# Patient Record
Sex: MALE | Race: WHITE | NOT HISPANIC OR LATINO | URBAN - METROPOLITAN AREA
[De-identification: names, ages, dates, MRNs, and addresses within clinical notes are randomized per-mention and may not be internally consistent; named-entity substitution may affect disease eponyms.]

---

## 2017-01-18 ENCOUNTER — IMPORTED ENCOUNTER (OUTPATIENT)
Dept: URBAN - METROPOLITAN AREA CLINIC 50 | Facility: CLINIC | Age: 72
End: 2017-01-18

## 2017-02-21 ENCOUNTER — IMPORTED ENCOUNTER (OUTPATIENT)
Dept: URBAN - METROPOLITAN AREA CLINIC 50 | Facility: CLINIC | Age: 72
End: 2017-02-21

## 2017-03-16 ENCOUNTER — IMPORTED ENCOUNTER (OUTPATIENT)
Dept: URBAN - METROPOLITAN AREA CLINIC 50 | Facility: CLINIC | Age: 72
End: 2017-03-16

## 2017-03-22 ENCOUNTER — IMPORTED ENCOUNTER (OUTPATIENT)
Dept: URBAN - METROPOLITAN AREA CLINIC 50 | Facility: CLINIC | Age: 72
End: 2017-03-22

## 2017-03-31 ENCOUNTER — IMPORTED ENCOUNTER (OUTPATIENT)
Dept: URBAN - METROPOLITAN AREA CLINIC 50 | Facility: CLINIC | Age: 72
End: 2017-03-31

## 2017-04-05 ENCOUNTER — IMPORTED ENCOUNTER (OUTPATIENT)
Dept: URBAN - METROPOLITAN AREA CLINIC 50 | Facility: CLINIC | Age: 72
End: 2017-04-05

## 2017-04-26 ENCOUNTER — IMPORTED ENCOUNTER (OUTPATIENT)
Dept: URBAN - METROPOLITAN AREA CLINIC 50 | Facility: CLINIC | Age: 72
End: 2017-04-26

## 2018-09-25 ENCOUNTER — IMPORTED ENCOUNTER (OUTPATIENT)
Dept: URBAN - METROPOLITAN AREA CLINIC 50 | Facility: CLINIC | Age: 73
End: 2018-09-25

## 2018-09-25 NOTE — PATIENT DISCUSSION
"""Annual Type 2 Diabetic eye exam with dilation. Mild nonproliferative diabetic retinopathy found. Bilateral macular edema is not present. Recommend annual dilated examinations. Patient instructed to call office immediately if sudden changes in vision occur. Emphasized importance of good blood glucose control. Summary of care provided to the physician managing the ongoing diabetes care. """

## 2019-03-26 ENCOUNTER — IMPORTED ENCOUNTER (OUTPATIENT)
Dept: URBAN - METROPOLITAN AREA CLINIC 50 | Facility: CLINIC | Age: 74
End: 2019-03-26

## 2019-09-26 ENCOUNTER — IMPORTED ENCOUNTER (OUTPATIENT)
Dept: URBAN - METROPOLITAN AREA CLINIC 50 | Facility: CLINIC | Age: 74
End: 2019-09-26

## 2021-05-15 ASSESSMENT — VISUAL ACUITY
OS_OTHER: 20/20. 20/40.
OS_SC: 20/30-
OD_BAT: 20/20
OD_PH: 20/60
OS_BAT: 20/30
OS_CC: J5
OD_SC: 20/80+
OD_OTHER: 20/30. 20/40.
OD_SC: 20/60-
OD_SC: 20/50
OS_SC: 20/40
OD_BAT: 20/30
OD_BAT: 20/40
OD_BAT: 20/30
OS_BAT: 20/20
OS_CC: J5
OD_OTHER: 20/20. 20/40.
OS_OTHER: 20/30. 20/40.
OD_PH: 20/30-
OD_OTHER: 20/30. 20/40.
OD_SC: 20/60+
OD_CC: J5
OD_SC: 20/80
OS_SC: 20/50
OS_CC: 20/50-
OS_SC: 20/25
OS_CC: J3
OD_SC: 20/80
OS_OTHER: 20/50. 20/100.
OD_CC: J3
OS_BAT: 20/50
OS_SC: 20/50-
OS_SC: 20/30-1
OS_SC: 20/30
OS_BAT: 20/50
OD_CC: J5
OD_SC: 20/80
OS_PH: 20/40
OD_OTHER: 20/40. 20/60.
OS_OTHER: 20/50. 20/50.

## 2021-05-15 ASSESSMENT — TONOMETRY
OS_IOP_MMHG: 22
OS_IOP_MMHG: 18
OD_IOP_MMHG: 19
OS_IOP_MMHG: 17
OS_IOP_MMHG: 15
OD_IOP_MMHG: 16
OS_IOP_MMHG: 15
OS_IOP_MMHG: 17
OD_IOP_MMHG: 16
OD_IOP_MMHG: 18
OS_IOP_MMHG: 18
OD_IOP_MMHG: 15
OD_IOP_MMHG: 18
OS_IOP_MMHG: 18
OD_IOP_MMHG: 14

## 2021-05-27 ENCOUNTER — COMPREHENSIVE EXAM (OUTPATIENT)
Dept: URBAN - METROPOLITAN AREA CLINIC 49 | Facility: CLINIC | Age: 76
End: 2021-05-27

## 2021-05-27 DIAGNOSIS — H43.813: ICD-10-CM

## 2021-05-27 DIAGNOSIS — H26.493: ICD-10-CM

## 2021-05-27 DIAGNOSIS — E11.9: ICD-10-CM

## 2021-05-27 DIAGNOSIS — H35.372: ICD-10-CM

## 2021-05-27 PROCEDURE — 92134 CPTRZ OPH DX IMG PST SGM RTA: CPT

## 2021-05-27 PROCEDURE — 92015 DETERMINE REFRACTIVE STATE: CPT

## 2021-05-27 PROCEDURE — 92014 COMPRE OPH EXAM EST PT 1/>: CPT

## 2021-05-27 ASSESSMENT — VISUAL ACUITY
OS_GLARE: 20/30
OU_SC: J3
OD_GLARE: 20/70
OD_GLARE: 20/40
OS_GLARE: 20/60
OS_SC: 20/25-1
OD_SC: 20/40-2

## 2021-05-27 ASSESSMENT — TONOMETRY
OS_IOP_MMHG: 18
OD_IOP_MMHG: 18

## 2022-08-25 ENCOUNTER — COMPREHENSIVE EXAM (OUTPATIENT)
Dept: URBAN - METROPOLITAN AREA CLINIC 48 | Facility: LOCATION | Age: 77
End: 2022-08-25

## 2022-08-25 DIAGNOSIS — E11.3291: ICD-10-CM

## 2022-08-25 DIAGNOSIS — H43.813: ICD-10-CM

## 2022-08-25 DIAGNOSIS — H35.372: ICD-10-CM

## 2022-08-25 DIAGNOSIS — G45.9: ICD-10-CM

## 2022-08-25 DIAGNOSIS — E11.9: ICD-10-CM

## 2022-08-25 DIAGNOSIS — H26.493: ICD-10-CM

## 2022-08-25 DIAGNOSIS — H04.123: ICD-10-CM

## 2022-08-25 PROCEDURE — 92014 COMPRE OPH EXAM EST PT 1/>: CPT

## 2022-08-25 PROCEDURE — 92134 CPTRZ OPH DX IMG PST SGM RTA: CPT

## 2022-08-25 ASSESSMENT — TONOMETRY
OS_IOP_MMHG: 12
OD_IOP_MMHG: 11

## 2022-08-25 ASSESSMENT — VISUAL ACUITY
OD_SC: 20/400
OS_SC: 20/200-1
OU_SC: 20/800

## 2022-08-25 NOTE — PATIENT DISCUSSION
Patient reports multiple strokes occurring somewhere on or between 8/9 & 8/10. MRI was done which showed stroke occurred on both left and right side. Patient is c/o loss of vision on left side and depth perception loss.

## 2022-08-25 NOTE — PATIENT DISCUSSION
NEW CRAO- The patient has a central retinal artery occlusion. We discussed this along with the guarded visual prognosis. Patient reports having multiple CVAs on 8/9/2022 & 8/10/2022. Currently taking Plavix and Xarelto.

## 2022-09-27 ENCOUNTER — COMPREHENSIVE EXAM (OUTPATIENT)
Dept: URBAN - METROPOLITAN AREA CLINIC 48 | Facility: LOCATION | Age: 77
End: 2022-09-27

## 2022-09-27 DIAGNOSIS — I63.9: ICD-10-CM

## 2022-09-27 DIAGNOSIS — H34.13: ICD-10-CM

## 2022-09-27 PROCEDURE — 92134 CPTRZ OPH DX IMG PST SGM RTA: CPT

## 2022-09-27 PROCEDURE — 92014 COMPRE OPH EXAM EST PT 1/>: CPT

## 2022-09-27 PROCEDURE — 92083 EXTENDED VISUAL FIELD XM: CPT

## 2022-09-27 ASSESSMENT — TONOMETRY
OD_IOP_MMHG: 12
OS_IOP_MMHG: 12

## 2022-09-27 ASSESSMENT — VISUAL ACUITY
OD_SC: 20/100
OS_SC: 20/80

## 2022-09-27 NOTE — PATIENT DISCUSSION
Previous Note:  The patient has a central retinal artery occlusion. We discussed this along with the guarded visual prognosis. Patient reports having multiple CVAs on 8/9/2022 & 8/10/2022. Currently taking Plavix and Xarelto.

## 2023-08-08 ENCOUNTER — COMPREHENSIVE EXAM (OUTPATIENT)
Dept: URBAN - METROPOLITAN AREA CLINIC 53 | Facility: CLINIC | Age: 78
End: 2023-08-08

## 2023-08-08 DIAGNOSIS — H02.834: ICD-10-CM

## 2023-08-08 DIAGNOSIS — H26.493: ICD-10-CM

## 2023-08-08 DIAGNOSIS — H04.123: ICD-10-CM

## 2023-08-08 DIAGNOSIS — E10.3291: ICD-10-CM

## 2023-08-08 DIAGNOSIS — I63.9: ICD-10-CM

## 2023-08-08 DIAGNOSIS — H43.813: ICD-10-CM

## 2023-08-08 DIAGNOSIS — H02.831: ICD-10-CM

## 2023-08-08 DIAGNOSIS — H35.372: ICD-10-CM

## 2023-08-08 DIAGNOSIS — H34.13: ICD-10-CM

## 2023-08-08 PROCEDURE — 99214 OFFICE O/P EST MOD 30 MIN: CPT

## 2023-08-08 PROCEDURE — 92134 CPTRZ OPH DX IMG PST SGM RTA: CPT

## 2023-08-08 ASSESSMENT — VISUAL ACUITY
OS_SC: 20/150-1
OD_SC: 20/150-1

## 2023-08-08 ASSESSMENT — KERATOMETRY
OS_AXISANGLE2_DEGREES: 9
OD_AXISANGLE2_DEGREES: 175
OS_AXISANGLE_DEGREES: 99
OD_K1POWER_DIOPTERS: 42.50
OS_K2POWER_DIOPTERS: 43.75
OD_K2POWER_DIOPTERS: 44.00
OS_K1POWER_DIOPTERS: 42.25
OD_AXISANGLE_DEGREES: 85

## 2023-08-08 ASSESSMENT — TONOMETRY
OD_IOP_MMHG: 14
OS_IOP_MMHG: 14